# Patient Record
Sex: FEMALE | Race: WHITE | Employment: OTHER | URBAN - METROPOLITAN AREA
[De-identification: names, ages, dates, MRNs, and addresses within clinical notes are randomized per-mention and may not be internally consistent; named-entity substitution may affect disease eponyms.]

---

## 2017-03-09 ENCOUNTER — APPOINTMENT (OUTPATIENT)
Dept: GENERAL RADIOLOGY | Facility: HOSPITAL | Age: 82
End: 2017-03-09
Attending: EMERGENCY MEDICINE
Payer: MEDICARE

## 2017-03-09 ENCOUNTER — HOSPITAL ENCOUNTER (EMERGENCY)
Facility: HOSPITAL | Age: 82
Discharge: HOME | End: 2017-03-09
Attending: EMERGENCY MEDICINE
Payer: MEDICARE

## 2017-03-09 ENCOUNTER — APPOINTMENT (OUTPATIENT)
Dept: CT IMAGING | Facility: HOSPITAL | Age: 82
End: 2017-03-09
Attending: EMERGENCY MEDICINE
Payer: MEDICARE

## 2017-03-09 VITALS
WEIGHT: 170 LBS | TEMPERATURE: 98 F | BODY MASS INDEX: 34.27 KG/M2 | OXYGEN SATURATION: 96 % | SYSTOLIC BLOOD PRESSURE: 121 MMHG | HEIGHT: 59 IN | HEART RATE: 70 BPM | DIASTOLIC BLOOD PRESSURE: 52 MMHG | RESPIRATION RATE: 18 BRPM

## 2017-03-09 DIAGNOSIS — M25.562 ACUTE PAIN OF LEFT KNEE: ICD-10-CM
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
## 2017-03-09 DIAGNOSIS — N30.00 ACUTE CYSTITIS WITHOUT HEMATURIA: ICD-10-CM

## 2017-03-09 LAB
ANION GAP SERPL CALC-SCNC: 6 MMOL/L (ref 0–18)
BASOPHILS # BLD: 0.2 K/UL (ref 0–0.2)
BASOPHILS NFR BLD: 3 %
BILIRUB UR QL: NEGATIVE
BUN SERPL-MCNC: 28 MG/DL (ref 8–20)
BUN/CREAT SERPL: 27.5 (ref 10–20)
CALCIUM SERPL-MCNC: 9.1 MG/DL (ref 8.5–10.5)
CHLORIDE SERPL-SCNC: 101 MMOL/L (ref 95–110)
CLARITY UR: CLEAR
CO2 SERPL-SCNC: 34 MMOL/L (ref 22–32)
COLOR UR: YELLOW
CREAT SERPL-MCNC: 1.02 MG/DL (ref 0.5–1.5)
EOSINOPHIL # BLD: 0.3 K/UL (ref 0–0.7)
EOSINOPHIL NFR BLD: 4 %
ERYTHROCYTE [DISTWIDTH] IN BLOOD BY AUTOMATED COUNT: 14.9 % (ref 11–15)
GLUCOSE SERPL-MCNC: 90 MG/DL (ref 70–99)
GLUCOSE UR-MCNC: NEGATIVE MG/DL
HCT VFR BLD AUTO: 31.6 % (ref 35–48)
HGB BLD-MCNC: 10.5 G/DL (ref 12–16)
HGB UR QL STRIP.AUTO: NEGATIVE
KETONES UR-MCNC: NEGATIVE MG/DL
LYMPHOCYTES # BLD: 1.8 K/UL (ref 1–4)
LYMPHOCYTES NFR BLD: 28 %
MCH RBC QN AUTO: 29.8 PG (ref 27–32)
MCHC RBC AUTO-ENTMCNC: 33.1 G/DL (ref 32–37)
MCV RBC AUTO: 90.1 FL (ref 80–100)
MONOCYTES # BLD: 0.7 K/UL (ref 0–1)
MONOCYTES NFR BLD: 11 %
NEUTROPHILS # BLD AUTO: 3.6 K/UL (ref 1.8–7.7)
NEUTROPHILS NFR BLD: 55 %
NITRITE UR QL STRIP.AUTO: POSITIVE
OSMOLALITY UR CALC.SUM OF ELEC: 297 MOSM/KG (ref 275–295)
PH UR: 6 [PH] (ref 5–8)
PLATELET # BLD AUTO: 239 K/UL (ref 140–400)
PMV BLD AUTO: 8.6 FL (ref 7.4–10.3)
POTASSIUM SERPL-SCNC: 3.9 MMOL/L (ref 3.3–5.1)
PROT UR-MCNC: NEGATIVE MG/DL
RBC # BLD AUTO: 3.5 M/UL (ref 3.7–5.4)
RBC #/AREA URNS AUTO: 3 /HPF
SODIUM SERPL-SCNC: 141 MMOL/L (ref 136–144)
SP GR UR STRIP: 1.01 (ref 1–1.03)
UROBILINOGEN UR STRIP-ACNC: <2
WBC # BLD AUTO: 6.5 K/UL (ref 4–11)
WBC #/AREA URNS AUTO: 46 /HPF

## 2017-03-09 PROCEDURE — 87186 SC STD MICRODIL/AGAR DIL: CPT | Performed by: EMERGENCY MEDICINE

## 2017-03-09 PROCEDURE — 85025 COMPLETE CBC W/AUTO DIFF WBC: CPT | Performed by: EMERGENCY MEDICINE

## 2017-03-09 PROCEDURE — 73560 X-RAY EXAM OF KNEE 1 OR 2: CPT

## 2017-03-09 PROCEDURE — 80048 BASIC METABOLIC PNL TOTAL CA: CPT | Performed by: EMERGENCY MEDICINE

## 2017-03-09 PROCEDURE — 73502 X-RAY EXAM HIP UNI 2-3 VIEWS: CPT

## 2017-03-09 PROCEDURE — 36415 COLL VENOUS BLD VENIPUNCTURE: CPT

## 2017-03-09 PROCEDURE — 90471 IMMUNIZATION ADMIN: CPT

## 2017-03-09 PROCEDURE — 87088 URINE BACTERIA CULTURE: CPT | Performed by: EMERGENCY MEDICINE

## 2017-03-09 PROCEDURE — 99285 EMERGENCY DEPT VISIT HI MDM: CPT

## 2017-03-09 PROCEDURE — 87086 URINE CULTURE/COLONY COUNT: CPT | Performed by: EMERGENCY MEDICINE

## 2017-03-09 PROCEDURE — 70450 CT HEAD/BRAIN W/O DYE: CPT

## 2017-03-09 PROCEDURE — 81001 URINALYSIS AUTO W/SCOPE: CPT | Performed by: EMERGENCY MEDICINE

## 2017-03-09 RX ORDER — CIPROFLOXACIN 500 MG/1
500 TABLET, FILM COATED ORAL ONCE
Status: COMPLETED | OUTPATIENT
Start: 2017-03-09 | End: 2017-03-09

## 2017-03-09 RX ORDER — CIPROFLOXACIN 500 MG/1
500 TABLET, FILM COATED ORAL 2 TIMES DAILY
Qty: 20 TABLET | Refills: 0 | Status: SHIPPED | OUTPATIENT
Start: 2017-03-09 | End: 2017-03-19

## 2017-03-09 RX ORDER — PHENAZOPYRIDINE HYDROCHLORIDE 200 MG/1
200 TABLET, FILM COATED ORAL ONCE
Status: COMPLETED | OUTPATIENT
Start: 2017-03-09 | End: 2017-03-09

## 2017-03-09 RX ORDER — ACETAMINOPHEN 500 MG
1000 TABLET ORAL ONCE
Status: COMPLETED | OUTPATIENT
Start: 2017-03-09 | End: 2017-03-09

## 2017-03-09 NOTE — ED NOTES
Spoke with Pt's son Gallo Abbott with Pt permission to update him on Pt plan of care and current status. [phone]. Pt resting on cart in no acute distress.

## 2017-03-09 NOTE — ED NOTES
Pt ambulated with assist approx 40 feet. Pt steady on her feet. Pt states she walks with a walker at home.

## 2017-03-09 NOTE — ED NOTES
[facility] ambulance called for Medic[facility] back to [facility] ETA 60-90 min.

## 2017-03-09 NOTE — ED INITIAL ASSESSMENT (HPI)
Pt stated that her left knee give out and landed on the floor, pt manage to get up without help, stated that her left knee hurt with pain score of 1/10, pt is able to move left leg without difficulty, cms intact.     Pt is not sure if she is on blood thinne

## 2017-03-09 NOTE — ED NOTES
Assisted Pt to the bathroom. Pt with complaints of burning with urination. Pt also concerned that she will not be able to get prescription filled in timely manner at [facility]. Will medicate Pt prior to discharge.

## 2017-03-09 NOTE — ED PROVIDER NOTES
Patient Seen in: [facility] AND [facility] Emergency Department    History   Patient presents with:  Contusion (musculoskeletal)    Stated Complaint:     HPI    40-year-old female presents via EMS from assisted living for complaint of a fall.   Patient states franki 03/09/17 0548 Temporal   SpO2 03/09/17 0548 97 %   O2 Device 03/09/17 0548 None (Room air)       Current:/52 mmHg  Pulse 70  Temp(Src) 97.8 °F (36.6 °C) (Temporal)  Resp 18  Ht 149.9 cm (4' 11\")  Wt 77.111 kg  BMI 34.32 kg/m2  SpO2 96%        Physic exhibits tenderness. Right knee: Normal.        Left knee: Tenderness found.         Right ankle: Normal.        Left ankle: Normal.        Cervical back: Normal.        Thoracic back: Normal.        Lumbar back: Normal.        Right hand: Normal. LIGHT GREEN   RAINBOW DRAW DARK GREEN   RAINBOW DRAW LAVENDER TALL (BNP)   URINE CULTURE, ROUTINE       MDM    Patient's exam is unremarkable. Cervical spine cleared using Nexus criteria. Head CT is unremarkable.   After Tylenol patient is able to ambulat cisterns, and sulci are appropriate for age. No hydrocephalus, subarachnoid hemorrhage, or mass. No midline shift. CEREBRUM: Age-appropriate atrophy is present, without visible acute hemorrhage or lesion.   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 Patient is advised to follow up with PCP for reevaluation. Return precautions were given. Patient voices understanding and agreement with the treatment plan. All questions were addressed and answered.                Disposition and Plan

## 2017-09-17 ENCOUNTER — HOSPITAL ENCOUNTER (EMERGENCY)
Facility: HOSPITAL | Age: 82
Discharge: LEFT BEFORE DISCHARGE | End: 2017-09-17
Attending: EMERGENCY MEDICINE
Payer: MEDICARE

## 2017-09-17 ENCOUNTER — APPOINTMENT (OUTPATIENT)
Dept: GENERAL RADIOLOGY | Facility: HOSPITAL | Age: 82
End: 2017-09-17
Attending: EMERGENCY MEDICINE
Payer: MEDICARE

## 2017-09-17 VITALS
HEIGHT: 58 IN | HEART RATE: 76 BPM | WEIGHT: 170 LBS | OXYGEN SATURATION: 97 % | SYSTOLIC BLOOD PRESSURE: 112 MMHG | BODY MASS INDEX: 35.68 KG/M2 | DIASTOLIC BLOOD PRESSURE: 66 MMHG | RESPIRATION RATE: 18 BRPM | TEMPERATURE: 99 F

## 2017-09-17 DIAGNOSIS — Z53.21 PATIENT LEFT BEFORE EVALUATION BY PHYSICIAN: Primary | ICD-10-CM

## 2017-09-17 PROCEDURE — 99283 EMERGENCY DEPT VISIT LOW MDM: CPT

## 2017-09-17 PROCEDURE — 72170 X-RAY EXAM OF PELVIS: CPT | Performed by: EMERGENCY MEDICINE

## 2017-09-17 NOTE — ED NOTES
pts daughter at bedside upset that pt needed help going to bathroom and that pt hasn't been to ct yet, cannot understand where all the staff is, I explained that there is a very critical pt and that staff had to tend to that pt and that I apologize that sh

## 2017-09-17 NOTE — ED PROVIDER NOTES
Patient Seen in: [facility] AND [facility] Emergency Department    History   Patient presents with:  Lump Mass (integumentary)    Stated Complaint:     HPI    Patient is an 59-year-old female who fell about a month ago at the snf home.   She has had a lump Normocephalic and atraumatic. Right Ear: External ear normal.   Left Ear: External ear normal.   Nose: Nose normal.   Mouth/Throat: Oropharynx is clear and moist.   Eyes: Conjunctivae and EOM are normal. Pupils are equal, round, and reactive to light. medications for this patient.

## 2017-09-17 NOTE — ED INITIAL ASSESSMENT (HPI)
Pt here per [facility] with c/o lump to left lower back s/p fall 1 month ago.  Since then brusing has subsided and the lump has been getting bigger and mor painful

## 2017-12-30 ENCOUNTER — HOSPITAL ENCOUNTER (EMERGENCY)
Facility: HOSPITAL | Age: 82
Discharge: HOME | End: 2017-12-30
Attending: EMERGENCY MEDICINE
Payer: MEDICARE

## 2017-12-30 VITALS
SYSTOLIC BLOOD PRESSURE: 114 MMHG | HEIGHT: 56 IN | DIASTOLIC BLOOD PRESSURE: 74 MMHG | HEART RATE: 55 BPM | WEIGHT: 160 LBS | BODY MASS INDEX: 35.99 KG/M2 | TEMPERATURE: 98 F | OXYGEN SATURATION: 98 % | RESPIRATION RATE: 20 BRPM

## 2017-12-30 DIAGNOSIS — L02.416 ABSCESS OF LEFT KNEE: Primary | ICD-10-CM

## 2017-12-30 LAB
ANION GAP SERPL CALC-SCNC: 10 MMOL/L (ref 0–18)
BASOPHILS # BLD: 0.2 K/UL (ref 0–0.2)
BASOPHILS NFR BLD: 3 %
BUN SERPL-MCNC: 16 MG/DL (ref 8–20)
BUN/CREAT SERPL: 18 (ref 10–20)
CALCIUM SERPL-MCNC: 9 MG/DL (ref 8.5–10.5)
CHLORIDE SERPL-SCNC: 98 MMOL/L (ref 95–110)
CO2 SERPL-SCNC: 30 MMOL/L (ref 22–32)
CREAT SERPL-MCNC: 0.89 MG/DL (ref 0.5–1.5)
EOSINOPHIL # BLD: 0.2 K/UL (ref 0–0.7)
EOSINOPHIL NFR BLD: 3 %
ERYTHROCYTE [DISTWIDTH] IN BLOOD BY AUTOMATED COUNT: 17.1 % (ref 11–15)
GLUCOSE SERPL-MCNC: 100 MG/DL (ref 70–99)
HCT VFR BLD AUTO: 31.5 % (ref 35–48)
HGB BLD-MCNC: 10.3 G/DL (ref 12–16)
LYMPHOCYTES # BLD: 2.1 K/UL (ref 1–4)
LYMPHOCYTES NFR BLD: 32 %
MCH RBC QN AUTO: 28.8 PG (ref 27–32)
MCHC RBC AUTO-ENTMCNC: 32.5 G/DL (ref 32–37)
MCV RBC AUTO: 88.6 FL (ref 80–100)
MONOCYTES # BLD: 0.5 K/UL (ref 0–1)
MONOCYTES NFR BLD: 8 %
NEUTROPHILS # BLD AUTO: 3.5 K/UL (ref 1.8–7.7)
NEUTROPHILS NFR BLD: 54 %
OSMOLALITY UR CALC.SUM OF ELEC: 287 MOSM/KG (ref 275–295)
PLATELET # BLD AUTO: 267 K/UL (ref 140–400)
PMV BLD AUTO: 8.3 FL (ref 7.4–10.3)
POTASSIUM SERPL-SCNC: 3.7 MMOL/L (ref 3.3–5.1)
RBC # BLD AUTO: 3.56 M/UL (ref 3.7–5.4)
SODIUM SERPL-SCNC: 138 MMOL/L (ref 136–144)
WBC # BLD AUTO: 6.5 K/UL (ref 4–11)

## 2017-12-30 PROCEDURE — 80048 BASIC METABOLIC PNL TOTAL CA: CPT | Performed by: EMERGENCY MEDICINE

## 2017-12-30 PROCEDURE — 85025 COMPLETE CBC W/AUTO DIFF WBC: CPT | Performed by: EMERGENCY MEDICINE

## 2017-12-30 PROCEDURE — 87070 CULTURE OTHR SPECIMN AEROBIC: CPT | Performed by: EMERGENCY MEDICINE

## 2017-12-30 PROCEDURE — 36415 COLL VENOUS BLD VENIPUNCTURE: CPT

## 2017-12-30 PROCEDURE — 99283 EMERGENCY DEPT VISIT LOW MDM: CPT

## 2017-12-30 PROCEDURE — 10061 I&D ABSCESS COMP/MULTIPLE: CPT

## 2017-12-30 PROCEDURE — 87205 SMEAR GRAM STAIN: CPT | Performed by: EMERGENCY MEDICINE

## 2017-12-30 RX ORDER — HYDROCODONE BITARTRATE AND ACETAMINOPHEN 5; 325 MG/1; MG/1
1 TABLET ORAL EVERY 6 HOURS PRN
COMMUNITY

## 2017-12-30 RX ORDER — CLINDAMYCIN HYDROCHLORIDE 300 MG/1
300 CAPSULE ORAL 3 TIMES DAILY
Qty: 21 CAPSULE | Refills: 0 | Status: SHIPPED | OUTPATIENT
Start: 2017-12-30 | End: 2018-01-06

## 2017-12-30 RX ORDER — ESCITALOPRAM OXALATE 20 MG/1
20 TABLET ORAL DAILY
COMMUNITY

## 2017-12-30 RX ORDER — ALBUTEROL SULFATE 90 UG/1
AEROSOL, METERED RESPIRATORY (INHALATION) EVERY 6 HOURS PRN
COMMUNITY

## 2017-12-30 RX ORDER — DILTIAZEM HYDROCHLORIDE EXTENDED-RELEASE TABLETS 180 MG/1
180 TABLET, EXTENDED RELEASE ORAL DAILY
COMMUNITY

## 2017-12-30 RX ORDER — TRAMADOL HYDROCHLORIDE 50 MG/1
50 TABLET ORAL EVERY 8 HOURS PRN
COMMUNITY

## 2017-12-30 RX ORDER — LOPERAMIDE HYDROCHLORIDE 2 MG/1
2 CAPSULE ORAL 2 TIMES DAILY PRN
COMMUNITY

## 2017-12-30 RX ORDER — GABAPENTIN 300 MG/1
300 CAPSULE ORAL 3 TIMES DAILY
COMMUNITY

## 2017-12-30 RX ORDER — ACETAMINOPHEN 325 MG/1
325 TABLET ORAL EVERY 4 HOURS PRN
COMMUNITY

## 2017-12-30 RX ORDER — MELATONIN
325 2 TIMES DAILY WITH MEALS
COMMUNITY

## 2017-12-30 RX ORDER — LOSARTAN POTASSIUM 100 MG/1
TABLET ORAL DAILY
COMMUNITY

## 2017-12-30 RX ORDER — OMEPRAZOLE 20 MG/1
40 CAPSULE, DELAYED RELEASE ORAL DAILY
COMMUNITY

## 2017-12-30 RX ORDER — FUROSEMIDE 40 MG/1
40 TABLET ORAL 2 TIMES DAILY
COMMUNITY

## 2017-12-30 RX ORDER — ALLOPURINOL 100 MG/1
100 TABLET ORAL DAILY
COMMUNITY

## 2018-03-27 ENCOUNTER — HOSPITAL ENCOUNTER (EMERGENCY)
Facility: HOSPITAL | Age: 83
Discharge: HOME | End: 2018-03-27
Attending: EMERGENCY MEDICINE
Payer: MEDICARE

## 2018-03-27 VITALS
SYSTOLIC BLOOD PRESSURE: 154 MMHG | BODY MASS INDEX: 31.41 KG/M2 | OXYGEN SATURATION: 95 % | HEIGHT: 60 IN | HEART RATE: 71 BPM | WEIGHT: 160 LBS | TEMPERATURE: 99 F | RESPIRATION RATE: 20 BRPM | DIASTOLIC BLOOD PRESSURE: 71 MMHG

## 2018-03-27 DIAGNOSIS — M79.605 PAIN IN BOTH LOWER EXTREMITIES: Primary | ICD-10-CM

## 2018-03-27 DIAGNOSIS — M79.604 PAIN IN BOTH LOWER EXTREMITIES: Primary | ICD-10-CM

## 2018-03-27 PROCEDURE — 99283 EMERGENCY DEPT VISIT LOW MDM: CPT

## 2018-03-27 NOTE — ED PROVIDER NOTES
Patient Seen in: [facility] Emergency Department    History   Patient presents with:  Leg Pain    Stated Complaint: leg cramps    HPI   History is provided by patient and EMS.     66-year-old female with history of anxiety, asthma, arrhythmia, diab Exam   ED Triage Vitals [03/27/18 0715]  BP: 154/71  Pulse: 71  Resp: 20  Temp: 98.6 °F (37 °C)  Temp src: Temporal  SpO2: 95 %  O2 Device: None (Room air)    Current:/71   Pulse 71   Temp 98.6 °F (37 °C) (Temporal)   Resp 20   Ht 152.4 cm (5')   Wt outpt followup      The patient was informed of their elevated blood pressure reading in the Emergency Department. They were informed of the dangers of undiagnosed and untreated hypertension.   Education regarding lifestyle modifications and the need for a

## 2018-03-27 NOTE — ED INITIAL ASSESSMENT (HPI)
Via medics from [employer] c/o leg cramping and pain this morning.  Given tramadol PTA and patient feels fine now

## 2018-03-27 NOTE — ED NOTES
Patient in no pain or discomfort at this time. Breakfast tray ordered. Plan to send back to the [facility].

## 2020-11-23 NOTE — ED NOTES
Chief Complaint   Patient presents with   • Pre-Op Exam     Surgery is this Friday.   • Physical       SUBJECTIVE:  Anamika Mckenna is a 51 year old  female seen today for a physical.  Other concerns:      Patient recently had a successful EGD that was done on 20. Patient is doing well.    She is s/p Right Endoscopic Carpal Tunnel Release and is doing well.     Patient has a Uvulectomy scheduled for 20 with Dr. Ortega.     Patient started taking Chantix last week to help quit smoking tobacco. She has been experiencing nausea and migraines.     Patient also reports feeling a lot of stress lately due to some family problems at home. She states she goes in between depression and anxiety. Patient has failed Wellbutirn in the past. She denies any suicidal thoughts or ideations.  Patient does report that she to get started on a medication currently also would like to get into counseling if possible.    Patient admits to mild shortness of breath. She uses her albuterol inhaler as needed and inhaler as needed.    Periods: S/p hysterectomy.    DIET:  Follows regular diet.      EXERCISE:  Denies regular exercise.    OB History    Para Term  AB Living   0 0 0 0 0 0   SAB TAB Ectopic Molar Multiple Live Births   0 0 0 0 0 0       Immunization History   Administered Date(s) Administered   • Influenza, Split 10/23/2008   • Influenza, injectable, quadrivalent 10/05/2016   • Influenza, injectable, quadrivalent, preservative-free 2017, 10/18/2018, 10/24/2019, 2020   • Measles 1973   • Pneumococcal polysaccharide, adult, 23 valent 2017   • Rubella 1973   • Shingles Zoster (Shingrix) 2020   • Tdap 2007, 2017       Health Maintenance Summary     Shingles Vaccine (2 of 2)  Overdue since 2020    Depression Screening (Yearly)  Next due on 2021    Breast Cancer Screening (Yearly)  Scheduled for 2021    DTaP/Tdap/Td Vaccine (3 - Td)  Next due on  Pt's son states pt has been complaining of L knee pain for several weeks. 1/11/2027    Colorectal Cancer Screen-   Next due on 1/16/2029    Pneumococcal Vaccine 0-64   Completed    Influenza Vaccine   Completed    Hepatitis B Vaccine   Aged Out    Meningococcal Vaccine   Aged Out    HPV Vaccine   Aged Out          Patient Active Problem List    Diagnosis Date Noted   • Allergic rhinitis due to dust mite 02/20/2017     Priority: Medium   • Allergic rhinitis due to animal (cat) (dog) hair and dander 02/20/2017     Priority: Medium   • Dysphagia 09/24/2020     Priority: Low   • Oropharyngeal dysphagia      Priority: Low   • Mixed hyperlipidemia 08/13/2020     Priority: Low   • Other emphysema (CMS/HCC) 08/13/2020     Priority: Low   • Right hip pain 02/11/2020     Priority: Low   • Acute midline low back pain 11/26/2018     Priority: Low   • Fibromyalgia 07/03/2018     Priority: Low   • Chronic neck pain 05/01/2018     Priority: Low   • Seasonal allergic rhinitis due to pollen 02/20/2017     Priority: Low   • Chronic allergic conjunctivitis 02/20/2017     Priority: Low   • Moderate persistent asthma without complication 02/20/2017     Priority: Low   • Tobacco use disorder      Priority: Low   • Intermittent asthma      Priority: Low   • Family history of diabetes mellitus 08/16/2016     Priority: Low     Paternal aunt maternal grandmother     • Family history of breast cancer in female 08/16/2016     Priority: Low     Paternal aunt     • History of laparoscopic-assisted vaginal hysterectomy 08/09/2016     Priority: Low      Has ovaries yet          ALLERGIES:  No Known Allergies    Outpatient Medications Marked as Taking for the 11/23/20 encounter (Office Visit) with Andreina Brand MD   Medication Sig Dispense Refill   • Vitamin D, Ergocalciferol, 1.25 mg (50,000 units) capsule Take 1 capsule by mouth 1 day a week. 12 capsule 1   • meloxicam (MOBIC) 15 MG tablet Take 1 tablet by mouth daily. 30 tablet 3   • ibuprofen (MOTRIN) 400 MG tablet Take 1 tablet by mouth every 4 hours as needed  for Pain. 84 tablet 0   • acetaminophen (TYLENOL) 500 MG tablet Take 1 tablet by mouth every 4 hours as needed for Pain. 84 tablet 0   • varenicline (Chantix) 1 MG tablet Take 1 tablet by mouth 2 times daily. 180 tablet 0   • albuterol (ProAir HFA) 108 (90 Base) MCG/ACT inhaler Inhale 2 puffs into the lungs every 4 hours as needed for Shortness of Breath or Wheezing. Due for annual physical, please call our clinic to schedule an appointment. 8.5 g 0   • pregabalin (LYRICA) 150 MG capsule Take 1 capsule by mouth 2 times daily. 60 capsule 0   • DULoxetine (CYMBALTA) 60 MG capsule Take 1 capsule by mouth 2 times daily. 60 capsule 2   • cetirizine (ZyrTEC Allergy) 10 MG tablet Take 1 tablet by mouth nightly. 30 tablet 5   • fluticasone (Flonase) 50 MCG/ACT nasal spray Spray 2 sprays in each nostril daily. 1 Bottle 5   • ipratropium (ATROVENT) 0.03 % nasal spray Spray 2 sprays in each nostril 3 times daily as needed for Rhinitis. 1 Bottle 5   • atorvastatin (LIPITOR) 20 MG tablet Take 1 tablet by mouth daily. 90 tablet 3   • benzonatate (TESSALON PERLES) 200 MG capsule Take 1 capsule by mouth 3 times daily as needed for Cough. 20 capsule 0   • diclofenac (VOLTAREN) 1 % gel Apply 4 g topically 4 times daily as needed (Pain). Apply to the knees. 300 g 2        Past Medical History:   Diagnosis Date   • Chronic pain    • COPD (chronic obstructive pulmonary disease) (CMS/HCC)    • Depression    • Fibromyalgia 07/2018   • Intermittent asthma    • Tobacco use disorder        Past Surgical History:   Procedure Laterality Date   • Appendectomy     • Breast lumpectomy Right 08/07/2017    Radial sclerosing lesion and adenosis with associated microcalcifications   • Colonoscopy  03/13/2017    Dr. Albright. Repeat in 10 years.   • Colonoscopy  01/16/2019    repeat in 10 yrs   • Esophagogastroduodenoscopy  03/13/2017    Dr. Albright. Normal biopsies.   • Hysterectomy     • Laparoscopic hysterectomy  07/27/2016    Single Incision  Laparoscopic Hysterectomy--(ovaries remain)   • Rotator cuff repair  3/2016    Right   • Salpingectomy  2016    Bilateral Salpingectomy   • Unlisted laparoscopy procedure   or        Family History   Problem Relation Age of Onset   • Macular degeneration Mother    • Heart disease Father 55        did smoke cigars   • Hypertension Father    • Arthritis Maternal Aunt    • Cancer, Breast Maternal Grandmother    • Diabetes Maternal Grandfather    • Cancer, Prostate Paternal Grandfather    • Cancer, Breast Paternal Aunt    • Cancer, Breast Paternal Aunt    • Diabetes Paternal Aunt    • Cancer Paternal Aunt         breast   • Other Sister         Ovarian Cysts    • Hypertension Brother    • Sleep Apnea Maternal Uncle        Social History     Tobacco Use   • Smoking status: Current Every Day Smoker     Packs/day: 0.25     Years: 33.00     Pack years: 8.25     Types: Cigarettes     Start date:      Last attempt to quit: 2019     Years since quittin.6   • Smokeless tobacco: Never Used   • Tobacco comment: 20 Started Cjantix today.has not started chantix yet. will be talking to her pcp today about this prescription that she never recived form her pharmacy. noted 2020    Substance Use Topics   • Alcohol use: Not Currently     Comment: 6 beers/week   • Drug use: No     Comment: Coffee 0-1 cup per day       REVIEW OF SYSTEMS:  GENERAL:  Denies fever, chills, or appetite changes.    SKIN:  Denies any concerning lesions.  No hair or nail concerns.  No bruising.  EYES:  Denies visual problems.  ENT:  Denies hearing concerns, sinus congestion, or rhinorrhea.  CARDIOVASCULAR:  Denies chest pain, palpitations and peripheral edema.  RESPIRATORY:  Denies shortness of breath, wheezing, coughing or snoring.  BREASTS:  Denies lumps, pain, skin changes or nipple discharge.  GI:  Denies abdominal pain, nausea, vomiting, diarrhea, or constipation.  :  Denies dysuria, frequency, urgency, bleeding or  incontinence.    GYN:  Denies vaginal discharge.  No history of sexually transmitted infections or concerns.   MUSCULOSKELETAL:  Denies any joint or muscle pain.   NEUROLOGIC:  As above in HPI.  PSYCH: Positive for anxiety and depression.    OBJECTIVE:  VITAL SIGNS:    Visit Vitals  /82   Pulse 84   Resp 18   Ht 5' 3\" (1.6 m)   Wt 70.6 kg   LMP 07/08/2016   SpO2 96%   BMI 27.58 kg/m²     GENERAL:  Alert, cooperative, pleasant female in no acute distress.  SKIN:  Warm and dry.  Normal turgor.  No rashes or lesions noted.  EYES:  Normal lids and lashes.  No conjunctivitis.  Sclerae anicteric.   HENT:  Head:  Normocephalic, atraumatic.  Ears:  TMs normal bilaterally.  External canals open.  Hearing appropriate to conversation.    NECK:  Soft, supple and no cervical or supraclavicular lymphadenopathy.  No thyromegaly.  CARDIOVASCULAR:  Regular rate, normal S1, S2.  No murmur, gallop or rub.  No peripheral edema.  RESPIRATORY:  Lungs clear to auscultation.  No wheezes, rhonchi or crackles.  BREASTS:  Soft and nontender to palpation.  No masses, skin dimpling or nipple discharge.  No axillary lymphadenopathy.  GI/:  Abdomen soft, nontender, nondistended, normoactive bowel sounds.  No hepatosplenomegaly or palpable masses.  Bladder nonpalpable.  MUSCULOSKELETAL:  Upper and lower extremities symmetric with equal strength bilaterally.  No joint deformities.  Spine with normal curvature.  NEUROLOGIC:  Oriented x3.  Cranial nerves intact.  Sensation grossly intact throughout.  PSYCHOLOGIC:  Good eye contact, thoughts connected.  Dress and appearance appropriate.  Mood and affect normal and congruent.      ASSESSMENT AND PLAN:    1. Annual physical exam    Up to date on Pap/mammogram/colonoscopy.  Counseled regarding substance abuse and STD.   Discussed lifestyle modification.   30 min of moderate exercise, 5 times per week or 45 min of intensive exercise 3 times per week along with strengthening exercise.  Discussed  calcium (1200 mg) and vitamin D (800 International Units, minimum) daily.   Discussed regular eye exams and dental visits.  Self-skin checks.  SPF >50.   Self-breast exams.   Immunizations as per nurse notes.    2. Hyperthyroidism    TSH stable.    3. Moderate persistent asthma without complication    Continue on Albuterol inhaler as needed.    4. Mixed hyperlipidemia    Continue on Atorvastatin 20 mg daily.    5. Fibromyalgia    Continue on Cymbalta 60 mg BID and Lyrica 150 mg BID.    6. Chronic cough    Continue on Tessalon Perles as needed.    7. Major depressive disorder, remission status unspecified    She does have prolonged QT and she understands that SSRI/SNRI's have a tendency for prolonged QT and arrhythmia. Discussed that Celexa and Lexapro are some of the contraindicated ones. She has been on Wellbutrin in the past which she failed. We are prescribing Fluoxetine 20 mg daily at this point. She understands that this may lead to prolonged QT and arrhythmia and any time she is noticing any palpitations or arrhythmia to stop immediatley. We will follow up in a month. She understands other side effects of SSRI's. She will continue on Chantix at this time, but discussed that if it is making her depression worst to stop immediatley. Also putting referral to behavioral health for the same.    8. Tobacco use    Patient is currently taking Chantix.        Recent PHQ 2/9 Score    PHQ 2:  Date Adult PHQ 2 Score Adult PHQ 2 Interpretation   8/13/2020 0 No further screening needed       PHQ 9:  Date Adult PHQ 9 Score Adult PHQ 9 Interpretation   11/22/2019 9 Mild Depression     DEPRESSION ASSESSMENT/PLAN:  Start and/or continue medication.  See orders for details.     Return in 1 year for annual physical exam.    On 11/23/2020, Silke TRINH scribed the services personally performed by Andreina Brand MD    The documentation recorded by the scribe accurately and completely reflects the service(s) I personally  performed and the decisions made by me.

## (undated) NOTE — ED AVS SNAPSHOT
[facility] Emergency Department    [address]     [address] [place] [address]    Phone:  [phone]    Fax:  [facility]   MRN: [id number]    Department:  [facility] Emergency Department   Date of Visit:  3/9 Insurance plans vary and the physician(s) referred by the ER may not be covered by your plan. Please contact your insurance company to determine coverage and benefits available for follow-up care and referrals.       If you have difficulty scheduling your prescription right away and begin taking the medication(s) as directed.   If you believe that any of the medications or instructions on this list is different from what your Primary Care doctor has instructed you - please continue to take your medications a [address] to help you get signed up for insurance coverage. [address] is a Federal Navigator program that can help with your Affordable Care Act coverage, as well as all types of Medicaid plans.   To get signed up and covere

## (undated) NOTE — ED AVS SNAPSHOT
[facility] Emergency Department    [address]     [address] [place] [address]    Phone:  [phone]    Fax:  [facility]   MRN: [id number]    Department:  [facility] Emergency Department   Date of Visit:  3/9 and Class Registration line at [phone] or find a doctor online by visiting [email or web address].    IF THERE IS ANY CHANGE OR WORSENING OF YOUR CONDITION, CALL YOUR PRIMARY CARE PHYSICIAN AT ONCE OR RETURN IMMEDIATELY TO [address] [place] [address].     If

## (undated) NOTE — ED AVS SNAPSHOT
Fredy Watson   MRN: [id number]    Department:  [facility] Emergency Department   Date of Visit:  9/17/2017           Disclosure     Insurance plans vary and the physician(s) referred by the ER may not be covered by your plan.  Please contact CARE PHYSICIAN AT ONCE OR RETURN IMMEDIATELY TO THE EMERGENCY DEPARTMENT. If you have been prescribed any medication(s), please fill your prescription right away and begin taking the medication(s) as directed.   If you believe that any of the medications

## (undated) NOTE — ED AVS SNAPSHOT
Norris Blanchard   MRN: [id number]    Department:  [facility] Emergency Department   Date of Visit:  12/30/2017           Disclosure     Insurance plans vary and the physician(s) referred by the ER may not be covered by your plan.  Please contac within the next three months to obtain basic health screening including reassessment of your blood pressure.     IF THERE IS ANY CHANGE OR WORSENING OF YOUR CONDITION, CALL YOUR PRIMARY CARE PHYSICIAN AT ONCE OR RETURN IMMEDIATELY TO THE EMERGENCY DEPARTMEN

## (undated) NOTE — ED AVS SNAPSHOT
Flako Montgomery   MRN: [id number]    Department:  [facility] Emergency Department   Date of Visit:  3/27/2018           Disclosure     Insurance plans vary and the physician(s) referred by the ER may not be covered by your plan.  Please contact within the next three months to obtain basic health screening including reassessment of your blood pressure.     IF THERE IS ANY CHANGE OR WORSENING OF YOUR CONDITION, CALL YOUR PRIMARY CARE PHYSICIAN AT ONCE OR RETURN IMMEDIATELY TO THE EMERGENCY DEPARTMEN